# Patient Record
Sex: MALE
[De-identification: names, ages, dates, MRNs, and addresses within clinical notes are randomized per-mention and may not be internally consistent; named-entity substitution may affect disease eponyms.]

---

## 2018-09-18 ENCOUNTER — CHARTING TRANS (OUTPATIENT)
Dept: OTHER | Age: 53
End: 2018-09-18

## 2018-09-25 ENCOUNTER — CHARTING TRANS (OUTPATIENT)
Dept: OTHER | Age: 53
End: 2018-09-25

## 2018-10-02 ENCOUNTER — CHARTING TRANS (OUTPATIENT)
Dept: OTHER | Age: 53
End: 2018-10-02

## 2018-10-09 ENCOUNTER — CHARTING TRANS (OUTPATIENT)
Dept: OTHER | Age: 53
End: 2018-10-09

## 2018-10-16 ENCOUNTER — CHARTING TRANS (OUTPATIENT)
Dept: OTHER | Age: 53
End: 2018-10-16

## 2018-11-06 ENCOUNTER — CHARTING TRANS (OUTPATIENT)
Dept: OTHER | Age: 53
End: 2018-11-06

## 2018-11-13 ENCOUNTER — CHARTING TRANS (OUTPATIENT)
Dept: OTHER | Age: 53
End: 2018-11-13

## 2018-11-23 ENCOUNTER — IMAGING SERVICES (OUTPATIENT)
Dept: OTHER | Age: 53
End: 2018-11-23

## 2018-12-11 ENCOUNTER — NURSING HOME VISIT (OUTPATIENT)
Dept: NEPHROLOGY | Age: 53
End: 2018-12-11

## 2018-12-11 VITALS
OXYGEN SATURATION: 95 % | WEIGHT: 230.6 LBS | TEMPERATURE: 97.9 F | HEART RATE: 84 BPM | DIASTOLIC BLOOD PRESSURE: 56 MMHG | BODY MASS INDEX: 35.06 KG/M2 | SYSTOLIC BLOOD PRESSURE: 97 MMHG | RESPIRATION RATE: 18 BRPM

## 2018-12-11 DIAGNOSIS — E87.1 HYPONATREMIA: Primary | ICD-10-CM

## 2018-12-11 DIAGNOSIS — K70.31 ALCOHOLIC CIRRHOSIS OF LIVER WITH ASCITES (CMD): ICD-10-CM

## 2018-12-11 DIAGNOSIS — I95.0 IDIOPATHIC HYPOTENSION: ICD-10-CM

## 2018-12-11 PROCEDURE — 99309 SBSQ NF CARE MODERATE MDM 30: CPT | Performed by: NURSE PRACTITIONER

## 2019-01-22 ENCOUNTER — NURSING HOME VISIT (OUTPATIENT)
Dept: NEPHROLOGY | Age: 54
End: 2019-01-22

## 2019-01-22 VITALS
DIASTOLIC BLOOD PRESSURE: 70 MMHG | RESPIRATION RATE: 18 BRPM | OXYGEN SATURATION: 98 % | BODY MASS INDEX: 34.79 KG/M2 | WEIGHT: 228.8 LBS | TEMPERATURE: 98.1 F | SYSTOLIC BLOOD PRESSURE: 108 MMHG | HEART RATE: 71 BPM

## 2019-01-22 DIAGNOSIS — K70.31 ALCOHOLIC CIRRHOSIS OF LIVER WITH ASCITES (CMD): ICD-10-CM

## 2019-01-22 DIAGNOSIS — E87.1 HYPONATREMIA: Primary | ICD-10-CM

## 2019-01-22 PROCEDURE — 99309 SBSQ NF CARE MODERATE MDM 30: CPT | Performed by: NURSE PRACTITIONER

## 2019-03-05 VITALS
OXYGEN SATURATION: 94 % | DIASTOLIC BLOOD PRESSURE: 67 MMHG | SYSTOLIC BLOOD PRESSURE: 104 MMHG | RESPIRATION RATE: 18 BRPM | HEART RATE: 80 BPM | BODY MASS INDEX: 33.3 KG/M2 | WEIGHT: 219 LBS | TEMPERATURE: 97.8 F

## 2019-03-05 VITALS
WEIGHT: 247 LBS | RESPIRATION RATE: 18 BRPM | SYSTOLIC BLOOD PRESSURE: 104 MMHG | DIASTOLIC BLOOD PRESSURE: 64 MMHG | HEART RATE: 97 BPM | OXYGEN SATURATION: 94 % | TEMPERATURE: 97.7 F | BODY MASS INDEX: 37.56 KG/M2

## 2019-03-05 VITALS
SYSTOLIC BLOOD PRESSURE: 111 MMHG | HEART RATE: 91 BPM | DIASTOLIC BLOOD PRESSURE: 73 MMHG | RESPIRATION RATE: 18 BRPM | OXYGEN SATURATION: 96 % | WEIGHT: 242 LBS | TEMPERATURE: 97.8 F | BODY MASS INDEX: 36.8 KG/M2

## 2019-03-05 VITALS
WEIGHT: 242 LBS | HEART RATE: 96 BPM | TEMPERATURE: 98.9 F | RESPIRATION RATE: 16 BRPM | OXYGEN SATURATION: 96 % | DIASTOLIC BLOOD PRESSURE: 64 MMHG | BODY MASS INDEX: 36.8 KG/M2 | SYSTOLIC BLOOD PRESSURE: 112 MMHG

## 2019-03-05 VITALS
SYSTOLIC BLOOD PRESSURE: 111 MMHG | HEART RATE: 78 BPM | DIASTOLIC BLOOD PRESSURE: 63 MMHG | OXYGEN SATURATION: 90 % | TEMPERATURE: 97.3 F | BODY MASS INDEX: 37.56 KG/M2 | RESPIRATION RATE: 18 BRPM | WEIGHT: 247 LBS

## 2019-03-05 VITALS
HEART RATE: 72 BPM | BODY MASS INDEX: 33.3 KG/M2 | OXYGEN SATURATION: 95 % | SYSTOLIC BLOOD PRESSURE: 102 MMHG | WEIGHT: 219 LBS | DIASTOLIC BLOOD PRESSURE: 64 MMHG | TEMPERATURE: 97.9 F | RESPIRATION RATE: 18 BRPM

## 2019-03-05 VITALS
RESPIRATION RATE: 16 BRPM | WEIGHT: 242 LBS | TEMPERATURE: 98 F | SYSTOLIC BLOOD PRESSURE: 120 MMHG | DIASTOLIC BLOOD PRESSURE: 71 MMHG | HEART RATE: 106 BPM | BODY MASS INDEX: 36.8 KG/M2 | OXYGEN SATURATION: 97 %

## 2019-09-18 NOTE — PROGRESS NOTES
[facility] at [facility]  [address], [address]  [address]  [phone] ([phone]) ideally stop completely.    - For volume overload, continue bumex 2 mg daily, aldactone 100 mg daily but since not showing any volume overload currently discussed he can skip a couple days/week and see how he does  - For EoV, last EGD ~ 2 year ago with band

## 2019-10-09 NOTE — TELEPHONE ENCOUNTER
Spoke to patient to get preferred pharmacy for prescription request for paroxetine 40 mg. Discussed need to have a PCP for ongoing care maintenance.  Providing 2 month supply which should last until next appointment with Dr. Yudith Yates in December where a longer

## 2019-10-18 ENCOUNTER — TELEPHONE (OUTPATIENT)
Dept: SURGERY | Facility: CLINIC | Age: 54
End: 2019-10-18

## 2019-10-18 NOTE — TELEPHONE ENCOUNTER
9/26/19 labs -     Bilirubin 2.6, ALT 13, AST 26, INR 1.1, Cr 0.70, Na 135, AFP 6.4, Platelet 500, Hgb 95.2    10/12/19 U/S cirrhosis, no masses

## 2019-12-18 NOTE — PROGRESS NOTES
[facility] at [facility]  [address]  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 screening  - Again, encouraged to stop alcohol  - For volume overload, doing well and will continue b

## 2020-01-17 ENCOUNTER — TELEPHONE (OUTPATIENT)
Dept: SURGERY | Facility: CLINIC | Age: 55
End: 2020-01-17

## 2020-01-17 NOTE — TELEPHONE ENCOUNTER
Requested refill for xifaxin.  Will submit CANDACE Aguirre  Nurse Practitioner, Hepatology  [phone] (office)

## 2020-01-20 ENCOUNTER — DOCUMENTATION ONLY (OUTPATIENT)
Dept: SURGERY | Facility: CLINIC | Age: 55
End: 2020-01-20

## 2020-01-20 NOTE — PROGRESS NOTES
Submitted PA request for Xifaxan ([id number]) awaiting approval; expected in 24 hours.      CANDACE Sibley  Nurse Practitioner, Hepatology  [phone] (office)

## 2020-01-21 NOTE — PROGRESS NOTES
PA approved # [id number]; currently through April; submitted documentation and request to get approval for full year.     CANDACE Forde  Nurse Practitioner, Hepatology  [phone] (office)

## 2020-04-06 ENCOUNTER — TELEPHONE (OUTPATIENT)
Dept: SURGERY | Facility: CLINIC | Age: 55
End: 2020-04-06

## 2020-04-06 NOTE — TELEPHONE ENCOUNTER
Returned patient's call concerning delay in scheduling US of liver due to COVID-19; reassured patient that test was not urgent and OK to be delayed.     Maria Elena Ramos, APRN  Nurse Practitioner, Hepatology  [phone] (office)

## 2020-05-01 ENCOUNTER — DOCUMENTATION ONLY (OUTPATIENT)
Dept: SURGERY | Facility: CLINIC | Age: 55
End: 2020-05-01

## 2020-05-20 NOTE — PROGRESS NOTES
Before the initiation of today's documented service, the patient or patient guardian verbally consented to virtual/remote treatment. More than 50% of the listed time was spent counseling or coordinating care.   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

## 2020-06-12 RX ORDER — RIFAXIMIN 550 MG/1
TABLET ORAL
Qty: 60 TABLET | Refills: 0 | OUTPATIENT
Start: 2020-06-12

## 2020-11-24 ENCOUNTER — TELEPHONE (OUTPATIENT)
Dept: SURGERY | Facility: CLINIC | Age: 55
End: 2020-11-24

## 2020-11-24 NOTE — TELEPHONE ENCOUNTER
Attempted to return patient's call left on office VM box. Message /question was not clear. Was not able to leave return message due to patient's VM not being set up.     CANDACE Chaparro  Nurse Practitioner, Hepatology  [phone] (office)

## 2021-01-04 PROCEDURE — 85610 PROTHROMBIN TIME: CPT | Performed by: INTERNAL MEDICINE

## 2021-01-04 PROCEDURE — 85025 COMPLETE CBC W/AUTO DIFF WBC: CPT | Performed by: INTERNAL MEDICINE

## 2021-01-04 PROCEDURE — 80053 COMPREHEN METABOLIC PANEL: CPT | Performed by: INTERNAL MEDICINE

## 2021-01-04 PROCEDURE — 82105 ALPHA-FETOPROTEIN SERUM: CPT | Performed by: INTERNAL MEDICINE

## 2021-01-05 NOTE — PROGRESS NOTES
[facility] at [facility]  [address]  [address]  [phone] ([phone]) LAD  Chest: no angiomata  CV: RRR, no murmur, no edema  Lungs: Clear to auscultation (B)  Abd: non-distended, non-tender, no hepatosplenomegaly  Derm: no rash  Neuro: A&Ox3, no asterixis  Psych: normal affect/mood      Assessment and Plan:  60year old wit

## 2021-06-23 DIAGNOSIS — K70.30 ALCOHOLIC CIRRHOSIS, UNSPECIFIED WHETHER ASCITES PRESENT (HCC): ICD-10-CM

## 2021-06-23 RX ORDER — PAROXETINE HYDROCHLORIDE 40 MG/1
40 TABLET, FILM COATED ORAL EVERY MORNING
Qty: 30 TABLET | Refills: 11 | Status: SHIPPED | OUTPATIENT
Start: 2021-06-23

## 2021-10-10 ENCOUNTER — TELEPHONE (OUTPATIENT)
Dept: SCHEDULING | Age: 56
End: 2021-10-10

## 2021-10-13 NOTE — PROGRESS NOTES
[facility] at [facility]  [address], [address]  [address]  [phone] ([phone]) 62   Resp 16   Ht 1.727 m (5' 8\")   Wt 101.6 kg (224 lb)   SpO2 95%   BMI 34.06 kg/m²   Gen: NAD  HEENT: Anicteric  Lymph: no cervical LAD  Chest: + angiomata  CV: RRR, no murmur, no edema  Lungs: Clear to auscultation (B)  Abd: non-distended, non-tender,

## 2021-12-29 DIAGNOSIS — K70.30 ALCOHOLIC CIRRHOSIS, UNSPECIFIED WHETHER ASCITES PRESENT (HCC): ICD-10-CM

## 2021-12-29 RX ORDER — PROPRANOLOL HYDROCHLORIDE 10 MG/1
10 TABLET ORAL 2 TIMES DAILY
Qty: 60 TABLET | Refills: 5 | Status: SHIPPED | OUTPATIENT
Start: 2021-12-29

## 2022-01-03 NOTE — PROGRESS NOTES
[facility] at [facility]  [address], [address]  [address]  [phone] ([phone]) NAD  HEENT: Anicteric  Lymph: no cervical LAD  Chest: + angiomata  CV: RRR, no murmur, no edema  Lungs: Clear to auscultation (B)  Abd: non-distended, non-tender, no hepatosplenomegaly  Derm: no rash  Neuro: A&Ox3, no asterixis  Psych: normal affect/mood

## 2022-06-29 DIAGNOSIS — K70.30 ALCOHOLIC CIRRHOSIS, UNSPECIFIED WHETHER ASCITES PRESENT (HCC): ICD-10-CM

## 2022-06-29 RX ORDER — SPIRONOLACTONE 100 MG/1
100 TABLET, FILM COATED ORAL DAILY
Qty: 30 TABLET | Refills: 5 | Status: SHIPPED | OUTPATIENT
Start: 2022-06-29

## 2022-06-29 RX ORDER — PROPRANOLOL HYDROCHLORIDE 10 MG/1
10 TABLET ORAL 2 TIMES DAILY
Qty: 60 TABLET | Refills: 5 | Status: SHIPPED | OUTPATIENT
Start: 2022-06-29

## 2022-07-08 DIAGNOSIS — K70.30 ALCOHOLIC CIRRHOSIS OF LIVER WITHOUT ASCITES (HCC): ICD-10-CM

## 2022-07-08 RX ORDER — RIFAXIMIN 550 MG/1
550 TABLET ORAL 2 TIMES DAILY
Qty: 60 TABLET | Refills: 11 | Status: SHIPPED | OUTPATIENT
Start: 2022-07-08

## 2022-12-29 ENCOUNTER — LAB REQUISITION (OUTPATIENT)
Dept: LAB | Age: 57
End: 2022-12-29

## 2022-12-29 DIAGNOSIS — Z13.9 ENCOUNTER FOR SCREENING, UNSPECIFIED: ICD-10-CM

## 2022-12-29 PROCEDURE — PSEU9049 QUANTIFERON TB PLUS: Performed by: CLINICAL MEDICAL LABORATORY

## 2022-12-29 PROCEDURE — 86480 TB TEST CELL IMMUN MEASURE: CPT | Performed by: CLINICAL MEDICAL LABORATORY

## 2022-12-31 LAB
GAMMA INTERFERON BACKGROUND BLD IA-ACNC: 0.03 IU/ML
M TB IFN-G BLD-IMP: NEGATIVE
M TB IFN-G CD4+ BCKGRND COR BLD-ACNC: 0 IU/ML
M TB IFN-G CD4+CD8+ BCKGRND COR BLD-ACNC: 0 IU/ML
MITOGEN IGNF BCKGRD COR BLD-ACNC: 0.89 IU/ML

## 2023-11-22 ENCOUNTER — TELEPHONE (OUTPATIENT)
Dept: GASTROENTEROLOGY | Age: 58
End: 2023-11-22

## 2023-11-22 ENCOUNTER — OFFICE VISIT (OUTPATIENT)
Dept: GASTROENTEROLOGY | Age: 58
End: 2023-11-22

## 2023-11-22 VITALS — WEIGHT: 190 LBS | HEIGHT: 69 IN | BODY MASS INDEX: 28.14 KG/M2

## 2023-11-22 DIAGNOSIS — K74.60 HEPATIC CIRRHOSIS, UNSPECIFIED HEPATIC CIRRHOSIS TYPE, UNSPECIFIED WHETHER ASCITES PRESENT (CMD): Primary | ICD-10-CM

## 2023-11-22 DIAGNOSIS — K74.60 CIRRHOSIS OF LIVER WITHOUT ASCITES, UNSPECIFIED HEPATIC CIRRHOSIS TYPE (CMD): Primary | ICD-10-CM

## 2023-11-22 PROCEDURE — 99205 OFFICE O/P NEW HI 60 MIN: CPT | Performed by: STUDENT IN AN ORGANIZED HEALTH CARE EDUCATION/TRAINING PROGRAM

## 2023-11-22 RX ORDER — CHOLECALCIFEROL (VITAMIN D3) 1250 MCG
1.25 CAPSULE ORAL DAILY
COMMUNITY

## 2023-11-22 RX ORDER — LORAZEPAM 2 MG/1
2 TABLET ORAL EVERY 6 HOURS PRN
COMMUNITY

## 2023-11-22 RX ORDER — LACTULOSE 10 G/15ML
20 SOLUTION ORAL 2 TIMES DAILY
COMMUNITY

## 2023-11-22 RX ORDER — HYDROCODONE BITARTRATE AND ACETAMINOPHEN 5; 325 MG/1; MG/1
1 TABLET ORAL EVERY 6 HOURS PRN
COMMUNITY
Start: 2023-11-12

## 2023-11-22 RX ORDER — BUMETANIDE 1 MG/1
1 TABLET ORAL 2 TIMES DAILY
COMMUNITY

## 2023-11-22 RX ORDER — LIDOCAINE 40 MG/G
1 CREAM TOPICAL PRN
COMMUNITY

## 2023-11-22 RX ORDER — FOLIC ACID 1 MG/1
1 TABLET ORAL DAILY
COMMUNITY
Start: 2023-11-03

## 2023-11-22 RX ORDER — SPIRONOLACTONE 100 MG/1
100 TABLET, FILM COATED ORAL DAILY
COMMUNITY

## 2023-11-22 RX ORDER — LANOLIN ALCOHOL/MO/W.PET/CERES
5 CREAM (GRAM) TOPICAL NIGHTLY
COMMUNITY

## 2023-11-22 RX ORDER — PANTOPRAZOLE SODIUM 40 MG/1
40 TABLET, DELAYED RELEASE ORAL DAILY
COMMUNITY

## 2023-11-22 RX ORDER — POTASSIUM CHLORIDE 1.5 G/1.58G
20 POWDER, FOR SOLUTION ORAL DAILY
COMMUNITY

## 2023-11-22 RX ORDER — ONDANSETRON 4 MG/1
4 TABLET, FILM COATED ORAL EVERY 4 HOURS PRN
COMMUNITY

## 2023-11-22 RX ORDER — THIAMINE MONONITRATE (VIT B1) 100 MG
100 TABLET ORAL DAILY
COMMUNITY

## 2023-11-22 RX ORDER — LEVOTHYROXINE SODIUM 0.03 MG/1
25 TABLET ORAL DAILY
COMMUNITY
Start: 2023-11-04

## 2023-11-29 ENCOUNTER — HOSPITAL ENCOUNTER (OUTPATIENT)
Dept: GASTROENTEROLOGY | Age: 58
Discharge: HOME | End: 2023-11-29
Attending: STUDENT IN AN ORGANIZED HEALTH CARE EDUCATION/TRAINING PROGRAM

## 2023-11-29 ENCOUNTER — ANESTHESIA (OUTPATIENT)
Dept: GASTROENTEROLOGY | Age: 58
End: 2023-11-29

## 2023-11-29 ENCOUNTER — ANESTHESIA EVENT (OUTPATIENT)
Dept: GASTROENTEROLOGY | Age: 58
End: 2023-11-29

## 2023-11-29 VITALS
OXYGEN SATURATION: 99 % | HEART RATE: 98 BPM | TEMPERATURE: 97.7 F | WEIGHT: 190 LBS | HEIGHT: 70 IN | SYSTOLIC BLOOD PRESSURE: 128 MMHG | DIASTOLIC BLOOD PRESSURE: 76 MMHG | BODY MASS INDEX: 27.2 KG/M2 | RESPIRATION RATE: 12 BRPM

## 2023-11-29 DIAGNOSIS — K74.60 HEPATIC CIRRHOSIS, UNSPECIFIED HEPATIC CIRRHOSIS TYPE, UNSPECIFIED WHETHER ASCITES PRESENT (CMD): ICD-10-CM

## 2023-11-29 PROCEDURE — 13000001 HB PHASE II RECOVERY EA 30 MINUTES

## 2023-11-29 PROCEDURE — 10002807 HB RX 258: Performed by: ANESTHESIOLOGY

## 2023-11-29 PROCEDURE — 10002807 HB RX 258: Performed by: STUDENT IN AN ORGANIZED HEALTH CARE EDUCATION/TRAINING PROGRAM

## 2023-11-29 PROCEDURE — 10002800 HB RX 250 W HCPCS: Performed by: ANESTHESIOLOGY

## 2023-11-29 PROCEDURE — 13000024 HB GI COMPLEX CASE S/U + 1ST 15 MIN

## 2023-11-29 PROCEDURE — 10004451 HB PACU RECOVERY 1ST 30 MINUTES

## 2023-11-29 PROCEDURE — 10002801 HB RX 250 W/O HCPCS: Performed by: ANESTHESIOLOGY

## 2023-11-29 PROCEDURE — 43235 EGD DIAGNOSTIC BRUSH WASH: CPT | Performed by: STUDENT IN AN ORGANIZED HEALTH CARE EDUCATION/TRAINING PROGRAM

## 2023-11-29 PROCEDURE — 13000008 HB ANESTHESIA MAC OUTSIDE OR

## 2023-11-29 RX ORDER — PROPOFOL 10 MG/ML
INJECTION, EMULSION INTRAVENOUS PRN
Status: DISCONTINUED | OUTPATIENT
Start: 2023-11-29 | End: 2023-11-29

## 2023-11-29 RX ORDER — UMECLIDINIUM 62.5 UG/1
1 AEROSOL, POWDER ORAL DAILY
COMMUNITY

## 2023-11-29 RX ORDER — ALBUTEROL SULFATE 90 UG/1
2 AEROSOL, METERED RESPIRATORY (INHALATION) EVERY 4 HOURS PRN
COMMUNITY

## 2023-11-29 RX ORDER — PROPRANOLOL HYDROCHLORIDE 20 MG/1
10 TABLET ORAL DAILY
Qty: 15 TABLET | Refills: 3 | Status: SHIPPED | OUTPATIENT
Start: 2023-11-29 | End: 2024-03-28

## 2023-11-29 RX ORDER — FLUTICASONE PROPIONATE AND SALMETEROL XINAFOATE 115; 21 UG/1; UG/1
2 AEROSOL, METERED RESPIRATORY (INHALATION) 2 TIMES DAILY
COMMUNITY

## 2023-11-29 RX ORDER — SODIUM CHLORIDE 9 MG/ML
INJECTION, SOLUTION INTRAVENOUS CONTINUOUS PRN
Status: DISCONTINUED | OUTPATIENT
Start: 2023-11-29 | End: 2023-11-29

## 2023-11-29 RX ORDER — GLYCOPYRROLATE 0.2 MG/ML
INJECTION, SOLUTION INTRAMUSCULAR; INTRAVENOUS PRN
Status: DISCONTINUED | OUTPATIENT
Start: 2023-11-29 | End: 2023-11-29

## 2023-11-29 RX ORDER — LIDOCAINE HYDROCHLORIDE 20 MG/ML
INJECTION, SOLUTION INFILTRATION; PERINEURAL PRN
Status: DISCONTINUED | OUTPATIENT
Start: 2023-11-29 | End: 2023-11-29

## 2023-11-29 RX ORDER — SODIUM CHLORIDE 9 MG/ML
INJECTION, SOLUTION INTRAVENOUS ONCE
Status: COMPLETED | OUTPATIENT
Start: 2023-11-29 | End: 2023-11-29

## 2023-11-29 RX ADMIN — PROPOFOL 50 MG: 10 INJECTION, EMULSION INTRAVENOUS at 14:58

## 2023-11-29 RX ADMIN — SODIUM CHLORIDE: 9 INJECTION, SOLUTION INTRAVENOUS at 14:28

## 2023-11-29 RX ADMIN — SODIUM CHLORIDE: 9 INJECTION, SOLUTION INTRAVENOUS at 14:54

## 2023-11-29 RX ADMIN — GLYCOPYRROLATE 0.2 MG: 0.2 INJECTION INTRAMUSCULAR; INTRAVENOUS at 14:42

## 2023-11-29 RX ADMIN — LIDOCAINE HYDROCHLORIDE 5 ML: 20 INJECTION, SOLUTION INFILTRATION; PERINEURAL at 14:58

## 2023-11-29 RX ADMIN — PROPOFOL 150 MCG/KG/MIN: 10 INJECTION, EMULSION INTRAVENOUS at 14:58

## 2023-11-29 SDOH — SOCIAL STABILITY: SOCIAL INSECURITY: RISK FACTORS: AGE

## 2023-11-29 SDOH — SOCIAL STABILITY: SOCIAL INSECURITY: RISK FACTORS: LIVER DISEASE

## 2023-11-29 ASSESSMENT — ACTIVITIES OF DAILY LIVING (ADL)
CONTINENCE: INDEPENDENT
FEEDING: INDEPENDENT
ADL_SCORE: 8
MOBILITY_ASSIST_DEVICES: WHEELCHAIR
DRESSING: NEEDS ASSISTANCE
ADL_BEFORE_ADMISSION: NEEDS/REQUIRES ASSISTANCE
RECENT_DECLINE_ADL: NO
CHRONIC_PAIN_PRESENT: NO
HISTORY OF FALLING IN THE LAST YEAR (PRIOR TO ADMISSION): YES
SENSORY_SUPPORT_DEVICES: DENTURES
BATHING: NEEDS ASSISTANCE
TOILETING: NEEDS ASSISTANCE
TRANSFERRING: NEEDS ASSISTANCE
ADL_SHORT_OF_BREATH: NO

## 2023-11-29 ASSESSMENT — PAIN SCALES - GENERAL
PAINLEVEL_OUTOF10: 0
PAINLEVEL_OUTOF10: 3

## 2023-11-29 ASSESSMENT — COGNITIVE AND FUNCTIONAL STATUS - GENERAL
ARE YOU DEAF OR DO YOU HAVE SERIOUS DIFFICULTY  HEARING: NO
ARE YOU BLIND OR DO YOU HAVE SERIOUS DIFFICULTY SEEING, EVEN WHEN WEARING GLASSES: NO

## 2023-11-29 ASSESSMENT — PAIN SCALES - WONG BAKER: WONGBAKER_NUMERICALRESPONSE: 0

## 2023-12-05 ENCOUNTER — LAB REQUISITION (OUTPATIENT)
Dept: LAB | Age: 58
End: 2023-12-05

## 2023-12-05 DIAGNOSIS — Z13.9 ENCOUNTER FOR SCREENING, UNSPECIFIED: ICD-10-CM

## 2023-12-05 PROCEDURE — PSEU9049 QUANTIFERON TB PLUS: Performed by: CLINICAL MEDICAL LABORATORY

## 2023-12-05 PROCEDURE — 86480 TB TEST CELL IMMUN MEASURE: CPT | Performed by: CLINICAL MEDICAL LABORATORY

## 2023-12-06 LAB
GAMMA INTERFERON BACKGROUND BLD IA-ACNC: 0.07 IU/ML
M TB IFN-G BLD-IMP: NEGATIVE
M TB IFN-G CD4+ BCKGRND COR BLD-ACNC: 0 IU/ML
M TB IFN-G CD4+CD8+ BCKGRND COR BLD-ACNC: 0 IU/ML
MITOGEN IGNF BCKGRD COR BLD-ACNC: 2.01 IU/ML

## 2023-12-26 ENCOUNTER — TELEPHONE (OUTPATIENT)
Dept: GASTROENTEROLOGY | Age: 58
End: 2023-12-26

## 2024-02-26 ENCOUNTER — TELEPHONE (OUTPATIENT)
Dept: GASTROENTEROLOGY | Age: 59
End: 2024-02-26

## 2024-02-26 DIAGNOSIS — K74.60 CIRRHOSIS OF LIVER WITHOUT ASCITES, UNSPECIFIED HEPATIC CIRRHOSIS TYPE (CMD): Primary | ICD-10-CM

## 2024-03-04 ENCOUNTER — APPOINTMENT (OUTPATIENT)
Dept: GASTROENTEROLOGY | Age: 59
End: 2024-03-04

## 2024-03-19 ENCOUNTER — HOSPITAL ENCOUNTER (OUTPATIENT)
Dept: ULTRASOUND IMAGING | Age: 59
Discharge: HOME | End: 2024-03-19
Attending: STUDENT IN AN ORGANIZED HEALTH CARE EDUCATION/TRAINING PROGRAM

## 2024-03-19 ENCOUNTER — HOSPITAL ENCOUNTER (OUTPATIENT)
Dept: CT IMAGING | Age: 59
Discharge: HOME | End: 2024-03-19
Attending: STUDENT IN AN ORGANIZED HEALTH CARE EDUCATION/TRAINING PROGRAM

## 2024-03-19 DIAGNOSIS — K74.60 CIRRHOSIS OF LIVER WITHOUT ASCITES, UNSPECIFIED HEPATIC CIRRHOSIS TYPE (CMD): ICD-10-CM

## 2024-03-19 PROCEDURE — 74170 CT ABD WO CNTRST FLWD CNTRST: CPT

## 2024-03-19 PROCEDURE — 76705 ECHO EXAM OF ABDOMEN: CPT

## 2024-03-19 PROCEDURE — 10002805 HB CONTRAST AGENT: Performed by: STUDENT IN AN ORGANIZED HEALTH CARE EDUCATION/TRAINING PROGRAM

## 2024-03-19 RX ADMIN — IOHEXOL 100 ML: 350 INJECTION, SOLUTION INTRAVENOUS at 11:07

## 2024-04-01 ENCOUNTER — APPOINTMENT (OUTPATIENT)
Dept: GASTROENTEROLOGY | Age: 59
End: 2024-04-01

## 2024-05-06 ENCOUNTER — APPOINTMENT (OUTPATIENT)
Dept: GASTROENTEROLOGY | Age: 59
End: 2024-05-06

## 2024-05-06 VITALS — BODY MASS INDEX: 36.4 KG/M2 | HEIGHT: 71 IN | WEIGHT: 260 LBS

## 2024-05-06 DIAGNOSIS — K74.60 CIRRHOSIS OF LIVER WITHOUT ASCITES, UNSPECIFIED HEPATIC CIRRHOSIS TYPE  (CMD): Primary | ICD-10-CM

## 2024-05-06 PROCEDURE — 99215 OFFICE O/P EST HI 40 MIN: CPT | Performed by: STUDENT IN AN ORGANIZED HEALTH CARE EDUCATION/TRAINING PROGRAM

## 2024-06-26 PROCEDURE — 93010 ELECTROCARDIOGRAM REPORT: CPT | Performed by: INTERNAL MEDICINE
